# Patient Record
Sex: FEMALE | Race: WHITE | NOT HISPANIC OR LATINO | URBAN - METROPOLITAN AREA
[De-identification: names, ages, dates, MRNs, and addresses within clinical notes are randomized per-mention and may not be internally consistent; named-entity substitution may affect disease eponyms.]

---

## 2018-11-13 ENCOUNTER — APPOINTMENT (OUTPATIENT)
Dept: URBAN - METROPOLITAN AREA CLINIC 217 | Age: 38
Setting detail: DERMATOLOGY
End: 2018-11-13

## 2018-11-13 DIAGNOSIS — D22 MELANOCYTIC NEVI: ICD-10-CM

## 2018-11-13 PROBLEM — D22.5 MELANOCYTIC NEVI OF TRUNK: Status: ACTIVE | Noted: 2018-11-13

## 2018-11-13 PROCEDURE — OTHER COUNSELING: OTHER

## 2018-11-13 PROCEDURE — 99202 OFFICE O/P NEW SF 15 MIN: CPT

## 2018-11-13 PROCEDURE — OTHER MIPS QUALITY: OTHER

## 2018-11-13 PROCEDURE — OTHER REASSURANCE: OTHER

## 2018-11-13 ASSESSMENT — LOCATION DETAILED DESCRIPTION DERM: LOCATION DETAILED: LEFT SUPERIOR UPPER BACK

## 2018-11-13 ASSESSMENT — LOCATION ZONE DERM: LOCATION ZONE: TRUNK

## 2018-11-13 ASSESSMENT — LOCATION SIMPLE DESCRIPTION DERM: LOCATION SIMPLE: LEFT UPPER BACK

## 2018-11-13 NOTE — PROCEDURE: MIPS QUALITY
Detail Level: Detailed
Quality 110: Preventive Care And Screening: Influenza Immunization: Influenza Immunization Ordered or Recommended, but not Administered due to system reason
Quality 226: Preventive Care And Screening: Tobacco Use: Screening And Cessation Intervention: Patient screened for tobacco use and is an ex/non-smoker

## 2020-01-23 ENCOUNTER — APPOINTMENT (OUTPATIENT)
Dept: URBAN - METROPOLITAN AREA CLINIC 217 | Age: 40
Setting detail: DERMATOLOGY
End: 2020-01-24

## 2020-01-23 DIAGNOSIS — L71.8 OTHER ROSACEA: ICD-10-CM

## 2020-01-23 PROCEDURE — OTHER MIPS QUALITY: OTHER

## 2020-01-23 PROCEDURE — OTHER PRESCRIPTION: OTHER

## 2020-01-23 PROCEDURE — OTHER TREATMENT REGIMEN: OTHER

## 2020-01-23 PROCEDURE — 99213 OFFICE O/P EST LOW 20 MIN: CPT

## 2020-01-23 PROCEDURE — OTHER COUNSELING: OTHER

## 2020-01-23 RX ORDER — IVERMECTIN 10 MG/G
CREAM TOPICAL
Qty: 1 | Refills: 3 | Status: ERX | COMMUNITY
Start: 2020-01-23

## 2020-01-23 ASSESSMENT — SEVERITY ASSESSMENT OVERALL AMONG ALL PATIENTS
IN YOUR EXPERIENCE, AMONG ALL PATIENTS YOU HAVE SEEN WITH THIS CONDITION, HOW SEVERE IS THIS PATIENT'S CONDITION?: MILD TO MODERATE

## 2020-01-23 NOTE — HPI: RASH (ROSACEA)
How Severe Is Your Rosacea?: moderate
Is This A New Presentation, Or A Follow-Up?: Rosacea
Additional History: Always had erythema on nose but recently broke out on cheek and chin, see photo. Her mom gave her some of her “rosacea pills” that seems to be helping.

## 2020-01-23 NOTE — PROCEDURE: TREATMENT REGIMEN
Detail Level: Zone
Initiate Treatment: Soolantra 1 % topical cream- apply to rosacea daily \\nOracea 40 mg capsule Take one pill PO daily in am.
Plan: Encouraged patient not to pick.

## 2020-02-17 ENCOUNTER — APPOINTMENT (OUTPATIENT)
Dept: URBAN - METROPOLITAN AREA CLINIC 217 | Age: 40
Setting detail: DERMATOLOGY
End: 2020-02-17

## 2020-02-17 DIAGNOSIS — L71.8 OTHER ROSACEA: ICD-10-CM

## 2020-02-17 PROCEDURE — OTHER PRESCRIPTION: OTHER

## 2020-02-17 RX ORDER — IVERMECTIN 10 MG/G
CREAM TOPICAL
Qty: 1 | Refills: 3 | Status: ERX | COMMUNITY
Start: 2020-02-17

## 2020-02-20 ENCOUNTER — APPOINTMENT (OUTPATIENT)
Dept: URBAN - METROPOLITAN AREA CLINIC 217 | Age: 40
Setting detail: DERMATOLOGY
End: 2020-02-20

## 2020-02-20 DIAGNOSIS — L71.8 OTHER ROSACEA: ICD-10-CM

## 2020-02-20 PROCEDURE — OTHER PRESCRIPTION: OTHER

## 2020-02-20 RX ORDER — IVERMECTIN 10 MG/G
CREAM TOPICAL
Qty: 1 | Refills: 3 | Status: ERX

## 2021-02-04 ENCOUNTER — APPOINTMENT (OUTPATIENT)
Dept: URBAN - METROPOLITAN AREA CLINIC 217 | Age: 41
Setting detail: DERMATOLOGY
End: 2021-02-05

## 2021-02-04 VITALS — TEMPERATURE: 96.5 F

## 2021-02-04 DIAGNOSIS — L40.0 PSORIASIS VULGARIS: ICD-10-CM

## 2021-02-04 DIAGNOSIS — D485 NEOPLASM OF UNCERTAIN BEHAVIOR OF SKIN: ICD-10-CM

## 2021-02-04 PROBLEM — D48.5 NEOPLASM OF UNCERTAIN BEHAVIOR OF SKIN: Status: ACTIVE | Noted: 2021-02-04

## 2021-02-04 PROCEDURE — 11102 TANGNTL BX SKIN SINGLE LES: CPT

## 2021-02-04 PROCEDURE — OTHER BIOPSY BY SHAVE METHOD: OTHER

## 2021-02-04 PROCEDURE — OTHER COUNSELING: OTHER

## 2021-02-04 PROCEDURE — OTHER DIAGNOSIS COMMENT: OTHER

## 2021-02-04 PROCEDURE — OTHER PRESCRIPTION: OTHER

## 2021-02-04 PROCEDURE — OTHER TREATMENT REGIMEN: OTHER

## 2021-02-04 PROCEDURE — OTHER MIPS QUALITY: OTHER

## 2021-02-04 PROCEDURE — 99214 OFFICE O/P EST MOD 30 MIN: CPT | Mod: 25

## 2021-02-04 RX ORDER — HYDROCORTISONE 25 MG/ML
LOTION TOPICAL
Qty: 1 | Refills: 1 | Status: ERX | COMMUNITY
Start: 2021-02-04

## 2021-02-04 ASSESSMENT — LOCATION DETAILED DESCRIPTION DERM
LOCATION DETAILED: PERIUMBILICAL SKIN
LOCATION DETAILED: RIGHT INFERIOR POSTAURICULAR SKIN
LOCATION DETAILED: RIGHT SUPERIOR POSTERIOR NECK
LOCATION DETAILED: LEFT LATERAL FOREHEAD
LOCATION DETAILED: RIGHT LATERAL FOREHEAD

## 2021-02-04 ASSESSMENT — LOCATION ZONE DERM
LOCATION ZONE: TRUNK
LOCATION ZONE: NECK
LOCATION ZONE: SCALP
LOCATION ZONE: FACE

## 2021-02-04 ASSESSMENT — LOCATION SIMPLE DESCRIPTION DERM
LOCATION SIMPLE: LEFT FOREHEAD
LOCATION SIMPLE: POSTERIOR NECK
LOCATION SIMPLE: SCALP
LOCATION SIMPLE: RIGHT FOREHEAD
LOCATION SIMPLE: ABDOMEN

## 2021-02-04 ASSESSMENT — PGA PSORIASIS: PGA PSORIASIS 2020: MILD

## 2021-02-04 NOTE — HPI: SECONDARY COMPLAINT
How Severe Is This Condition?: moderate
Additional History: See pics of patients cell phone pics.\\n\\nPatient was screened before evaluation for COVID-19 by inquiring about fever, shortness of breath, weakness, fatigue, loss of taste or smell, and gastrointestinal symptoms.  Patient denied any of the above.  Temperature was taken and patient was afebrile.

## 2021-02-04 NOTE — PROCEDURE: BIOPSY BY SHAVE METHOD
Bill For Surgical Tray: no
Anesthesia Volume In Cc (Will Not Render If 0): 0.3
Cryotherapy Text: The wound bed was treated with cryotherapy after the biopsy was performed.
Dressing: bandage
Notification Instructions: Patient will be notified of biopsy results. However, patient instructed to call the office if not contacted within 2 weeks.
Electrodesiccation Text: The wound bed was treated with electrodesiccation after the biopsy was performed.
Anticipated Plan (Based On Presumed Biopsy Results): Further management pending pathology report
Post-Care Instructions: I reviewed with the patient in detail post-care instructions. Patient is to keep the biopsy site dry overnight, and then apply bacitracin twice daily until healed. Patient may apply hydrogen peroxide soaks to remove any crusting.  Wound care sheet given and discussed.
Biopsy Type: H and E
Render Post-Care Instructions In Note?: yes
Hemostasis: Aluminum Chloride
Electrodesiccation And Curettage Text: The wound bed was treated with electrodesiccation and curettage after the biopsy was performed.
Additional Anesthesia Volume In Cc (Will Not Render If 0): 0
Depth Of Biopsy: dermis
Consent: Verbal consent was obtained and risks were reviewed including but not limited to scarring, infection, bleeding, scabbing, incomplete removal, nerve damage and allergy to anesthesia.
Path Notes (To The Dermatopathologist): Cc: Dr. Sohan Vasquez
Billing Type: Third-Party Bill
Wound Care: Vaseline
Information: Selecting Yes will display possible errors in your note based on the variables you have selected. This validation is only offered as a suggestion for you. PLEASE NOTE THAT THE VALIDATION TEXT WILL BE REMOVED WHEN YOU FINALIZE YOUR NOTE. IF YOU WANT TO FAX A PRELIMINARY NOTE YOU WILL NEED TO TOGGLE THIS TO 'NO' IF YOU DO NOT WANT IT IN YOUR FAXED NOTE.
Detail Level: Simple
Biopsy Method: Dermablade
Anesthesia Type: 1% lidocaine without epinephrine
Type Of Destruction Used: Electrodesiccation
Silver Nitrate Text: The wound bed was treated with silver nitrate after the biopsy was performed.

## 2021-02-04 NOTE — PROCEDURE: TREATMENT REGIMEN
Initiate Regimen: Hydrocortisone 2.5% Lotion. Apply to affected areas BID PRN Start Regimen: Hydrocortisone 2.5% Lotion. Apply to affected areas BID PRN

## 2021-02-04 NOTE — PROCEDURE: DIAGNOSIS COMMENT
Render Risk Assessment In Note?: yes
Comment: nevus vs hemangioma vs r/o malignancy
Detail Level: Simple

## 2021-07-14 ENCOUNTER — APPOINTMENT (OUTPATIENT)
Dept: URBAN - METROPOLITAN AREA CLINIC 217 | Age: 41
Setting detail: DERMATOLOGY
End: 2021-07-14

## 2021-07-14 DIAGNOSIS — L63.8 OTHER ALOPECIA AREATA: ICD-10-CM

## 2021-07-14 PROCEDURE — 99213 OFFICE O/P EST LOW 20 MIN: CPT

## 2021-07-14 PROCEDURE — OTHER DIAGNOSIS COMMENT: OTHER

## 2021-07-14 PROCEDURE — OTHER EDUCATIONAL RESOURCES PROVIDED: OTHER

## 2021-07-14 PROCEDURE — OTHER PRESCRIPTION: OTHER

## 2021-07-14 PROCEDURE — OTHER TREATMENT REGIMEN: OTHER

## 2021-07-14 PROCEDURE — OTHER MIPS QUALITY: OTHER

## 2021-07-14 PROCEDURE — OTHER COUNSELING: OTHER

## 2021-07-14 RX ORDER — CLOBETASOL PROPIONATE 0.05 MG/G
GEL TOPICAL
Qty: 1 | Refills: 2 | Status: ERX | COMMUNITY
Start: 2021-07-14

## 2021-07-14 ASSESSMENT — SEVERITY OF ALOPECIA TOOL: % SCALP HAIR LOST: 1

## 2021-07-14 ASSESSMENT — SEVERITY ASSESSMENT OVERALL AMONG ALL PATIENTS
IN YOUR EXPERIENCE, AMONG ALL PATIENTS YOU HAVE SEEN WITH THIS CONDITION, HOW SEVERE IS THIS PATIENT'S CONDITION?: S1 (LESS THAN 25% HAIR LOSS)

## 2021-07-14 NOTE — PROCEDURE: DIAGNOSIS COMMENT
Comment: Highly doubt autoimmune diseases at this time due to patient’s good health
Detail Level: Simple
Render Risk Assessment In Note?: yes

## 2021-07-14 NOTE — PROCEDURE: MIPS QUALITY
Detail Level: Generalized
Quality 130: Documentation Of Current Medications In The Medical Record: Current Medications Documented
Quality 110: Preventive Care And Screening: Influenza Immunization: Influenza Immunization Ordered or Recommended, but not Administered due to system reason
Quality 226: Preventive Care And Screening: Tobacco Use: Screening And Cessation Intervention: Patient screened for tobacco use and is an ex/non-smoker

## 2021-07-14 NOTE — HPI: HAIR LOSS
How Did The Hair Loss Occur?: gradual in onset
How Severe Is Your Hair Loss?: mild
Additional History: Patient presents with some hair loss at base of neck on left side. Patient states she felt dry, scaly patch there and continually rubbed conditioner on the area to try to soften it but feels she rubbed the hair follicles out. Patient does have hx of psoriasis but feels like it may be more dermatitis related. Interested in recommendations to grow hair back in area of loss. \\n\\nPatient was screened before evaluation for COVID-19 by inquiring about fever, shortness of breath, weakness, fatigue, loss of taste or smell, and gastrointestinal symptoms.  Patient denied any of the above. \\n\\n

## 2021-09-27 ENCOUNTER — APPOINTMENT (OUTPATIENT)
Dept: URBAN - METROPOLITAN AREA CLINIC 217 | Age: 41
Setting detail: DERMATOLOGY
End: 2021-09-27

## 2021-09-27 DIAGNOSIS — L63.8 OTHER ALOPECIA AREATA: ICD-10-CM

## 2021-09-27 PROCEDURE — 99213 OFFICE O/P EST LOW 20 MIN: CPT

## 2021-09-27 PROCEDURE — OTHER TREATMENT REGIMEN: OTHER

## 2021-09-27 PROCEDURE — OTHER COUNSELING: OTHER

## 2021-09-27 NOTE — PROCEDURE: TREATMENT REGIMEN
Continue Regimen: Clobetasol twice daily
Plan: We Mutually agreed to continue Clobetasol .  If in 4 months no improvement per patient will consider injection kenalog
Detail Level: Detailed

## 2022-03-21 ENCOUNTER — APPOINTMENT (OUTPATIENT)
Dept: URBAN - METROPOLITAN AREA CLINIC 217 | Age: 42
Setting detail: DERMATOLOGY
End: 2022-03-21

## 2022-03-21 DIAGNOSIS — L63.8 OTHER ALOPECIA AREATA: ICD-10-CM

## 2022-03-21 PROCEDURE — OTHER TREATMENT REGIMEN: OTHER

## 2022-03-21 PROCEDURE — 99213 OFFICE O/P EST LOW 20 MIN: CPT

## 2022-03-21 PROCEDURE — OTHER COUNSELING: OTHER

## 2022-03-21 PROCEDURE — OTHER MIPS QUALITY: OTHER

## 2022-03-21 ASSESSMENT — SEVERITY ASSESSMENT OVERALL AMONG ALL PATIENTS
IN YOUR EXPERIENCE, AMONG ALL PATIENTS YOU HAVE SEEN WITH THIS CONDITION, HOW SEVERE IS THIS PATIENT'S CONDITION?: S2 (25-49% HAIR LOSS)

## 2022-03-21 NOTE — PROCEDURE: TREATMENT REGIMEN
Detail Level: Detailed
Plan: We Mutually agreed to continue Clobetasol .  Talked about better compliance. If  no improvement per patient will consider injection kenalog \\n\\nReassured no ROS suggests any other cause of hair loss besides AA
Continue Regimen: Clobetasol twice daily

## 2024-07-29 NOTE — PROCEDURE: MIPS QUALITY
Quality 110: Preventive Care And Screening: Influenza Immunization: Influenza Immunization not Administered for Documented Reasons.
Voltaren gel    Gabapentin to help with symptoms in the meantime    -  Next bone density in October 2025    --    Continue with Prolia   
Detail Level: Generalized
Quality 130: Documentation Of Current Medications In The Medical Record: Current Medications Documented